# Patient Record
Sex: MALE | Race: WHITE | NOT HISPANIC OR LATINO | Employment: FULL TIME | URBAN - METROPOLITAN AREA
[De-identification: names, ages, dates, MRNs, and addresses within clinical notes are randomized per-mention and may not be internally consistent; named-entity substitution may affect disease eponyms.]

---

## 2017-06-18 ENCOUNTER — HOSPITAL ENCOUNTER (EMERGENCY)
Facility: OTHER | Age: 62
Discharge: HOME | End: 2017-06-18
Attending: INTERNAL MEDICINE
Payer: COMMERCIAL

## 2017-06-18 VITALS
DIASTOLIC BLOOD PRESSURE: 90 MMHG | TEMPERATURE: 98 F | SYSTOLIC BLOOD PRESSURE: 152 MMHG | HEART RATE: 69 BPM | OXYGEN SATURATION: 97 % | RESPIRATION RATE: 18 BRPM

## 2017-06-18 DIAGNOSIS — R07.9 CHEST PAIN: ICD-10-CM

## 2017-06-18 DIAGNOSIS — R07.89 NON-CARDIAC CHEST PAIN: Primary | ICD-10-CM

## 2017-06-18 LAB
ALBUMIN SERPL-MCNC: 3.7 G/DL (ref 3.3–5.5)
ALP SERPL-CCNC: 57 U/L (ref 42–141)
BILIRUB SERPL-MCNC: 0.6 MG/DL (ref 0.2–1.6)
BUN SERPL-MCNC: 20 MG/DL (ref 7–22)
CALCIUM SERPL-MCNC: 9.2 MG/DL (ref 8–10.3)
CHLORIDE SERPL-SCNC: 102 MMOL/L (ref 98–108)
CREAT SERPL-MCNC: 1.1 MG/DL (ref 0.6–1.2)
GLUCOSE SERPL-MCNC: 118 MG/DL (ref 73–118)
POC ALT (SGPT): 31 U/L (ref 10–47)
POC AST (SGOT): 25 U/L (ref 11–38)
POC CARDIAC TROPONIN I: 0 NG/ML
POC TCO2: 27 MMOL/L (ref 18–33)
POTASSIUM BLD-SCNC: 4.1 MMOL/L (ref 3.6–5.1)
PROTEIN, POC: 6.4 G/DL (ref 6.4–8.1)
SAMPLE: NORMAL
SODIUM BLD-SCNC: 137 MMOL/L (ref 128–145)

## 2017-06-18 PROCEDURE — 25000003 PHARM REV CODE 250: Performed by: INTERNAL MEDICINE

## 2017-06-18 PROCEDURE — 80053 COMPREHEN METABOLIC PANEL: CPT

## 2017-06-18 PROCEDURE — 99284 EMERGENCY DEPT VISIT MOD MDM: CPT

## 2017-06-18 PROCEDURE — 85025 COMPLETE CBC W/AUTO DIFF WBC: CPT

## 2017-06-18 PROCEDURE — 93010 ELECTROCARDIOGRAM REPORT: CPT | Mod: S$GLB,,, | Performed by: INTERNAL MEDICINE

## 2017-06-18 PROCEDURE — 84484 ASSAY OF TROPONIN QUANT: CPT

## 2017-06-18 PROCEDURE — 93005 ELECTROCARDIOGRAM TRACING: CPT

## 2017-06-18 RX ORDER — ZOLPIDEM TARTRATE 5 MG/1
5 TABLET ORAL NIGHTLY PRN
COMMUNITY

## 2017-06-18 RX ORDER — LISINOPRIL 20 MG/1
20 TABLET ORAL DAILY
COMMUNITY

## 2017-06-18 RX ORDER — SIMVASTATIN 10 MG/1
10 TABLET, FILM COATED ORAL NIGHTLY
COMMUNITY

## 2017-06-18 RX ORDER — NITROGLYCERIN 0.4 MG/1
0.4 TABLET SUBLINGUAL
Status: DISCONTINUED | OUTPATIENT
Start: 2017-06-18 | End: 2017-06-18

## 2017-06-18 RX ORDER — AMOXICILLIN 500 MG
2 CAPSULE ORAL DAILY
COMMUNITY

## 2017-06-18 RX ORDER — ASPIRIN 325 MG
325 TABLET, DELAYED RELEASE (ENTERIC COATED) ORAL
Status: COMPLETED | OUTPATIENT
Start: 2017-06-18 | End: 2017-06-18

## 2017-06-18 RX ORDER — NAPROXEN SODIUM 220 MG/1
81 TABLET, FILM COATED ORAL DAILY
COMMUNITY

## 2017-06-18 RX ADMIN — ASPIRIN 325 MG: 325 TABLET, DELAYED RELEASE ORAL at 05:06

## 2017-06-18 NOTE — ED PROVIDER NOTES
Encounter Date: 6/18/2017       History     Chief Complaint   Patient presents with    Chest Pain     x 1 week, increasing in frequency,  with light headed feeling this morning     Review of patient's allergies indicates:  No Known Allergies  62-year-old male presents to the emergency department complaining of chest pain ×1 week.  States he also had mild lightheadedness this morning but denies syncope      The history is provided by the patient. No  was used.   Chest Pain   The current episode started several days ago. Duration of episode(s) is 1 week. Chest pain occurs frequently. The chest pain is resolved. At its most intense, the chest pain is at 4/10. The chest pain is currently at 0/10. The quality of the pain is described as sharp. The pain does not radiate. Pertinent negatives for primary symptoms include no fever, no fatigue, no syncope, no shortness of breath, no cough, no wheezing, no palpitations, no abdominal pain, no nausea, no vomiting and no altered mental status. He tried nothing for the symptoms. Risk factors include male gender and obesity.     Past Medical History:   Diagnosis Date    Cancer     Hypertension      History reviewed. No pertinent surgical history.  History reviewed. No pertinent family history.  Social History   Substance Use Topics    Smoking status: Former Smoker    Smokeless tobacco: Not on file    Alcohol use Yes     Review of Systems   Constitutional: Negative for fatigue and fever.   Eyes: Negative.    Respiratory: Negative for cough, chest tightness, shortness of breath and wheezing.    Cardiovascular: Positive for chest pain. Negative for palpitations, leg swelling and syncope.   Gastrointestinal: Negative for abdominal pain, nausea and vomiting.   Musculoskeletal: Negative.    Skin: Negative.    Neurological: Positive for light-headedness (resolved).   All other systems reviewed and are negative.      Physical Exam     Initial Vitals   BP Pulse  Resp Temp SpO2   06/18/17 0544 06/18/17 0540 06/18/17 0540 06/18/17 0540 06/18/17 0540   (!) 152/90 73 18 98.2 °F (36.8 °C) 98 %     Physical Exam    Nursing note and vitals reviewed.  Constitutional: He appears well-developed and well-nourished. No distress.   HENT:   Head: Normocephalic and atraumatic.   Eyes: Conjunctivae and EOM are normal.   Neck: Normal range of motion. Neck supple.   Cardiovascular: Normal rate, regular rhythm, normal heart sounds and intact distal pulses.   Pulmonary/Chest: Breath sounds normal. No respiratory distress.   Abdominal: Soft. Bowel sounds are normal. He exhibits no distension.   Musculoskeletal: Normal range of motion.   Neurological: He is alert and oriented to person, place, and time. No cranial nerve deficit.   Skin: Skin is warm and dry.   Psychiatric: He has a normal mood and affect. Thought content normal.         ED Course   Procedures  Labs Reviewed   POCT CBC   POCT CMP   POCT TROPONIN     EKG Readings: (Independently Interpreted)   Initial Reading: No STEMI. Rhythm: Normal Sinus Rhythm. Heart Rate: 73. Ectopy: No Ectopy. Conduction: Normal. ST Segments: Normal ST Segments. T Waves: Normal. Clinical Impression: Normal Sinus Rhythm          Medical Decision Making:   Initial Assessment:   62-year-old male presents to the emergency department complaining of chest pain ×1 week.  States he also had mild lightheadedness this morning but denies syncope  Differential Diagnosis:   Myocardial infarction  Pleurisy  GERD  Costochondritis  Clinical Tests:   Lab Tests: Ordered and Reviewed  Radiological Study: Ordered and Reviewed  Medical Tests: Ordered and Reviewed  ED Management:  Cardiac workup revealed no acute myocardial infarction.  Patient was advised to follow with his primary care physician tomorrow for reevaluation.                   ED Course     Clinical Impression:   The primary diagnosis is noncardiac chest pain  Disposition:   Disposition: Discharged  Condition:  Stable       Tonny Mace MD  06/27/17 0516

## 2017-06-20 NOTE — PROGRESS NOTES
This note was created by combination of typed  and Dragon dictation.  Transcription errors may be present.  If there are any questions, please contact me.    Assessment & Plan  Atypical chest pain  Essential hypertension  Mixed hyperlipidemia  Lightheadedness  Palpitations   - atypical timing, occurs during rest, did not respond to attempts to reduce stomach acid; notes palpitations but no sensation of irregularity.  Could be anxious - notes high stress; however with risk factors - age, HTN, hyperlipidemia - would evaluate further for ischemia and/or arrhythmia.  Urgent referral to cardiology; BP controlled; on statin; on ASA; check labs - CBC, CMP, TSH, A1c; CXR  -     Comprehensive metabolic panel; Future; Expected date: 06/21/2017  -     Hemoglobin A1c; Future; Expected date: 06/21/2017  -     TSH; Future; Expected date: 06/21/2017  -     CBC auto differential; Future; Expected date: 06/21/2017  -     Ambulatory referral to Cardiology  -     X-Ray Chest PA And Lateral; Future; Expected date: 06/21/2017    There are no discontinued medications.    Follow-up: No Follow-up on file.      =================================================================      Chief Complaint   Patient presents with    ER followup       HPI  Jerry is a 62 y.o. male, last appointment with this clinic was Visit date not found.    New to clinic.  ER visit 6/18 for chest pain.  EKG WNL. POC CMP WNL    From [place], [place]. Here in [place] for work until July.  [employer].  He's been having chest pain, sharp, fleeting, takes his breath away and then gone.  No correlation with activity.  Accompanying dizziness, and heart sensation of pounding out of his chest.  Sunday morning woke with chest pain, was concerned it might be heart; tried TUMS without relief.  Sensation of front and back.  No more episodes since.  However, he notes some issues with feeling confused, like he doesn't know what to do next.  And he'll feel dizzy,  but actually more like lightheadedness.  Tingling in the feet comes and goes.  No unilateral tingling, numbness.  Notes headache from stress he attributes.  Long hours, eating poorly.      Hx of HTN, hyperlipidemia. No hx of stress testing.  Prostate CA  s/p XRT has been lost to follow up since moving to [facility].  Due for colonoscopy; transportation is the issue in [facility].    fam hx of CAD - father HTN, CVA  Mother lung CA,   Sister - heart murmur, gallstones, detached retina, hearing issues.  No children.    Former smoker  EtOH - used to be binge drinking on weekends but much less.     In [facility], Татьяна Engle PA-C, phone [phone], fax [phone]  [address], [address], [place] [address]    Patient Care Team:  Primary Doctor No as PCP - General    Patient Active Problem List    Diagnosis Date Noted    Non-cardiac chest pain 2017       PAST MEDICAL HISTORY:  Past Medical History:   Diagnosis Date    Cancer     prostate    Hyperlipidemia     Hypertension        PAST SURGICAL HISTORY:  History reviewed. No pertinent surgical history.    Family History   Problem Relation Age of Onset    Lung cancer Mother     Stroke Father     Heart disease Father     Hypertension Father     Gallbladder disease Sister     Retinal detachment Sister     Heart murmur Sister        SOCIAL HISTORY:  Social History     Social History    Marital status: Single     Spouse name: N/A    Number of children: N/A    Years of education: N/A     Occupational History    [employer]      Social History Main Topics    Smoking status: Former Smoker    Smokeless tobacco: Not on file    Alcohol use Yes    Drug use: No    Sexual activity: Not on file     Other Topics Concern    Not on file     Social History Narrative    No narrative on file       ALLERGIES AND MEDICATIONS: updated and reviewed.  Review of patient's allergies indicates:  No Known  "Allergies  Current Outpatient Prescriptions   Medication Sig Dispense Refill    aspirin 81 MG Chew Take 81 mg by mouth once daily.      fish oil-omega-3 fatty acids 300-1,000 mg capsule Take 2 g by mouth once daily.      lisinopril (PRINIVIL,ZESTRIL) 20 MG tablet Take 20 mg by mouth once daily.      simvastatin (ZOCOR) 10 MG tablet Take 10 mg by mouth every evening.      zolpidem (AMBIEN) 5 MG Tab Take 5 mg by mouth nightly as needed.       No current facility-administered medications for this visit.        Review of Systems   Constitutional: Negative for chills and fever.   Respiratory: Negative for cough and shortness of breath.    Cardiovascular: Positive for palpitations.       Physical Exam  Vitals:    06/21/17 0832   BP: 114/80   Pulse: 68   Temp: 98 °F (36.7 °C)   SpO2: 98%   Weight: 114.4 kg (252 lb 3.3 oz)   Height: 6' 3" (1.905 m)    Body mass index is 31.52 kg/m².  Weight: 114.4 kg (252 lb 3.3 oz)   Height: 6' 3" (190.5 cm)     BP measured both arms, symmetric    Physical Exam   Constitutional: He is oriented to person, place, and time. He appears well-developed and well-nourished. No distress.   Eyes: EOM are normal.   Cardiovascular: Normal rate, regular rhythm and normal heart sounds.    No murmur heard.  Pulmonary/Chest: Effort normal and breath sounds normal.   Musculoskeletal: Normal range of motion.   Neurological: He is alert and oriented to person, place, and time. He has normal reflexes. He exhibits normal muscle tone. Coordination normal.   Romberg negative. Pronator drift negative; normal gait   Skin: Skin is warm and dry.   Psychiatric: He has a normal mood and affect. His behavior is normal. Thought content normal.     Component      Latest Ref Rng & Units 6/18/2017   Albumin, POC      3.3 - 5.5 g/dL 3.7   Alkaline Phosphatase, POC      42 - 141 U/L 57   ALT (SGPT), POC      10 - 47 U/L 31   AST (SGOT), POC      11 - 38 U/L 25   POC BUN      7 - 22 mg/dL 20   Calcium, POC      8.0 - " 10.3 mg/dL 9.2   POC Chloride      98 - 108 mmol/L 102   POC Creatinine      0.6 - 1.2 mg/dL 1.1   POC Glucose      73 - 118 mg/dL 118 (H)   POC Potassium      3.6 - 5.1 mmol/L 4.1   POC Sodium      128 - 145 mmol/L 137   Bilirubin      0.2 - 1.6 mg/dL 0.6   POC TCO2      18 - 33 mmol/L 27   Protein      6.4 - 8.1 g/dL 6.4 (L)   POC Cardiac Troponin I      <0.09 ng/mL 0.00   Sample       GABBY     Component      Latest Ref Rng & Units 6/18/2017   Albumin, POC      3.3 - 5.5 g/dL 3.7   Alkaline Phosphatase, POC      42 - 141 U/L 57   ALT (SGPT), POC      10 - 47 U/L 31   AST (SGOT), POC      11 - 38 U/L 25   POC BUN      7 - 22 mg/dL 20   Calcium, POC      8.0 - 10.3 mg/dL 9.2   POC Chloride      98 - 108 mmol/L 102   POC Creatinine      0.6 - 1.2 mg/dL 1.1   POC Glucose      73 - 118 mg/dL 118 (H)   POC Potassium      3.6 - 5.1 mmol/L 4.1   POC Sodium      128 - 145 mmol/L 137   Bilirubin      0.2 - 1.6 mg/dL 0.6   POC TCO2      18 - 33 mmol/L 27   Protein      6.4 - 8.1 g/dL 6.4 (L)   POC Cardiac Troponin I      <0.09 ng/mL 0.00   Sample       GABBY

## 2017-06-21 ENCOUNTER — OFFICE VISIT (OUTPATIENT)
Dept: CARDIOLOGY | Facility: CLINIC | Age: 62
End: 2017-06-21
Payer: COMMERCIAL

## 2017-06-21 ENCOUNTER — TELEPHONE (OUTPATIENT)
Dept: FAMILY MEDICINE | Facility: CLINIC | Age: 62
End: 2017-06-21

## 2017-06-21 ENCOUNTER — HOSPITAL ENCOUNTER (OUTPATIENT)
Dept: RADIOLOGY | Facility: HOSPITAL | Age: 62
Discharge: HOME | End: 2017-06-21
Attending: INTERNAL MEDICINE
Payer: COMMERCIAL

## 2017-06-21 ENCOUNTER — OFFICE VISIT (OUTPATIENT)
Dept: FAMILY MEDICINE | Facility: CLINIC | Age: 62
End: 2017-06-21
Payer: COMMERCIAL

## 2017-06-21 ENCOUNTER — HOSPITAL ENCOUNTER (OUTPATIENT)
Dept: CARDIOLOGY | Facility: HOSPITAL | Age: 62
Discharge: HOME | End: 2017-06-21
Attending: INTERNAL MEDICINE
Payer: COMMERCIAL

## 2017-06-21 VITALS
OXYGEN SATURATION: 98 % | TEMPERATURE: 98 F | DIASTOLIC BLOOD PRESSURE: 80 MMHG | HEIGHT: 75 IN | HEART RATE: 68 BPM | SYSTOLIC BLOOD PRESSURE: 114 MMHG | BODY MASS INDEX: 31.36 KG/M2 | WEIGHT: 252.19 LBS

## 2017-06-21 VITALS
HEART RATE: 67 BPM | SYSTOLIC BLOOD PRESSURE: 106 MMHG | HEIGHT: 75 IN | WEIGHT: 252 LBS | DIASTOLIC BLOOD PRESSURE: 72 MMHG | BODY MASS INDEX: 31.33 KG/M2 | OXYGEN SATURATION: 97 %

## 2017-06-21 DIAGNOSIS — R07.89 CHEST PAIN, ATYPICAL: ICD-10-CM

## 2017-06-21 DIAGNOSIS — R07.89 ATYPICAL CHEST PAIN: Primary | ICD-10-CM

## 2017-06-21 DIAGNOSIS — I10 ESSENTIAL HYPERTENSION: ICD-10-CM

## 2017-06-21 DIAGNOSIS — E78.2 MIXED HYPERLIPIDEMIA: ICD-10-CM

## 2017-06-21 DIAGNOSIS — R00.2 PALPITATIONS: ICD-10-CM

## 2017-06-21 DIAGNOSIS — I10 ESSENTIAL HYPERTENSION: Primary | ICD-10-CM

## 2017-06-21 DIAGNOSIS — R42 LIGHTHEADEDNESS: ICD-10-CM

## 2017-06-21 DIAGNOSIS — R07.89 ATYPICAL CHEST PAIN: ICD-10-CM

## 2017-06-21 DIAGNOSIS — Z85.46 HISTORY OF PROSTATE CANCER: ICD-10-CM

## 2017-06-21 PROBLEM — R73.03 PRE-DIABETES: Status: ACTIVE | Noted: 2017-06-21

## 2017-06-21 LAB
ESTIMATED PA SYSTOLIC PRESSURE: 20.47
GLOBAL PERICARDIAL EFFUSION: NORMAL
RETIRED EF AND QEF - SEE NOTES: 55 (ref 55–65)
TRICUSPID VALVE REGURGITATION: NORMAL

## 2017-06-21 PROCEDURE — 93325 DOPPLER ECHO COLOR FLOW MAPG: CPT | Mod: 26,,, | Performed by: INTERNAL MEDICINE

## 2017-06-21 PROCEDURE — 93351 STRESS TTE COMPLETE: CPT | Mod: 26,,, | Performed by: INTERNAL MEDICINE

## 2017-06-21 PROCEDURE — 93320 DOPPLER ECHO COMPLETE: CPT | Mod: 26,,, | Performed by: INTERNAL MEDICINE

## 2017-06-21 PROCEDURE — 93320 DOPPLER ECHO COMPLETE: CPT

## 2017-06-21 PROCEDURE — 71020 XR CHEST PA AND LATERAL: CPT | Mod: TC,PO

## 2017-06-21 PROCEDURE — 99204 OFFICE O/P NEW MOD 45 MIN: CPT | Mod: S$GLB,,, | Performed by: INTERNAL MEDICINE

## 2017-06-21 PROCEDURE — 99999 PR PBB SHADOW E&M-EST. PATIENT-LVL III: CPT | Mod: PBBFAC,,, | Performed by: INTERNAL MEDICINE

## 2017-06-21 PROCEDURE — 93325 DOPPLER ECHO COLOR FLOW MAPG: CPT

## 2017-06-21 PROCEDURE — 71020 XR CHEST PA AND LATERAL: CPT | Mod: 26,,, | Performed by: RADIOLOGY

## 2017-06-21 NOTE — PROGRESS NOTES
Subjective:    Patient ID:  Jerry Greer is a 62 y.o. male who presents for evaluation of Chest Pain      HPI     Referred by Dr Jerad Pennington to clinic.  ER visit  for chest pain.  EKG WNL. POC CMP WNL     From [place], [place]. Here in [place] for work until July.  [employer].  He's been having chest pain, sharp, fleeting, takes his breath away and then gone.  No correlation with activity.  Accompanying dizziness, and heart sensation of pounding out of his chest.   morning woke with chest pain, was concerned it might be heart; tried TUMS without relief.  Sensation of front and back.  No more episodes since.  However, he notes some issues with feeling confused, like he doesn't know what to do next.  And he'll feel dizzy, but actually more like lightheadedness.  Tingling in the feet comes and goes.  No unilateral tingling, numbness.  Notes headache from stress he attributes.  Long hours, eating poorly.       Hx of HTN, hyperlipidemia. No hx of stress testing.  Prostate CA  s/p XRT has been lost to follow up since moving to [facility].  Due for colonoscopy; transportation is the issue in [facility].     fam hx of CAD - father HTN, CVA  Mother lung CA,   Sister - heart murmur, gallstones, detached retina, hearing issues.  No children.     Former smoker  EtOH - used to be binge drinking on weekends but much less.       Review of Systems   Constitution: Negative for decreased appetite.   HENT: Negative for ear discharge.    Eyes: Negative for blurred vision.   Endocrine: Negative for polyphagia.   Skin: Negative for nail changes.   Neurological: Negative for aphonia.   Psychiatric/Behavioral: Negative for hallucinations.        Objective:    Physical Exam   Constitutional: He is oriented to person, place, and time. He appears well-developed and well-nourished.   HENT:   Head: Normocephalic and atraumatic.   Eyes: Conjunctivae are normal. Pupils are equal, round, and reactive to light.   Neck: Normal  range of motion. Neck supple.   Cardiovascular: Normal rate, normal heart sounds and intact distal pulses.    Pulmonary/Chest: Effort normal and breath sounds normal.   Abdominal: Soft. Bowel sounds are normal.   Musculoskeletal: Normal range of motion.   Neurological: He is alert and oriented to person, place, and time.   Skin: Skin is warm and dry.         Assessment:       1. Essential hypertension    2. Chest pain, atypical    3. Mixed hyperlipidemia         Plan:          CP with several cardiac risk factors  Stress echo - will clear for work if negative

## 2017-06-22 ENCOUNTER — PATIENT MESSAGE (OUTPATIENT)
Dept: CARDIOLOGY | Facility: CLINIC | Age: 62
End: 2017-06-22

## 2017-06-23 ENCOUNTER — TELEPHONE (OUTPATIENT)
Dept: CARDIOLOGY | Facility: CLINIC | Age: 62
End: 2017-06-23

## 2017-06-26 ENCOUNTER — TELEPHONE (OUTPATIENT)
Dept: FAMILY MEDICINE | Facility: CLINIC | Age: 62
End: 2017-06-26

## 2017-06-26 DIAGNOSIS — F41.9 ANXIETY: ICD-10-CM

## 2017-06-26 RX ORDER — ALPRAZOLAM 0.25 MG/1
0.25 TABLET ORAL 3 TIMES DAILY
Qty: 30 TABLET | Refills: 0 | Status: SHIPPED | OUTPATIENT
Start: 2017-06-26 | End: 2017-07-26

## 2017-06-26 RX ORDER — ALPRAZOLAM 0.25 MG/1
0.25 TABLET ORAL 3 TIMES DAILY
Qty: 30 TABLET | Refills: 0 | Status: SHIPPED | OUTPATIENT
Start: 2017-06-26 | End: 2017-06-26 | Stop reason: SDUPTHER

## 2017-06-26 RX ORDER — SERTRALINE HYDROCHLORIDE 50 MG/1
50 TABLET, FILM COATED ORAL DAILY
Qty: 30 TABLET | Refills: 11 | Status: SHIPPED | OUTPATIENT
Start: 2017-06-26 | End: 2018-06-26

## 2017-06-26 NOTE — TELEPHONE ENCOUNTER
Spoke with pt; he's interested in starting rx for anxiety, start on zoloft 50 mg.  SE profile discussed.    Originally slated to return to [place] early July but possible he might extend his work stay here until Sept.  If he's still in town he'll reach out to me if needed re: meds and anxiety.

## 2019-07-23 NOTE — LETTER
June 21, 2017      Deep Mars MD  [address]  [place] [address]           [facility] - Cardiology  [address] [facility] [address] [place] [address]  Phone: [phone]          Patient: Jerry Greer   MR Number: [id number]   YOB: 1955   Date of Visit: 6/21/2017       Dear Dr. Deep Mars:    Thank you for referring Jerry Greer to me for evaluation. Attached you will find relevant portions of my assessment and plan of care.    If you have questions, please do not hesitate to call me. I look forward to following Jerry Greer along with you.    Sincerely,    Mack Mackenzie MD    Enclosure  CC:  No Recipients    If you would like to receive this communication electronically, please contact [email or web address] or [phone] to request more information on [employer] Link access.    For providers and/or their staff who would like to refer a patient to [facility], please contact us through our one-stop-shop provider referral line, Aubree Bowden, at [phone].    If you feel you have received this communication in error or would no longer like to receive these types of communications, please e-mail [email or web address]         
91.6